# Patient Record
Sex: FEMALE | Race: WHITE | NOT HISPANIC OR LATINO | Employment: UNEMPLOYED | URBAN - METROPOLITAN AREA
[De-identification: names, ages, dates, MRNs, and addresses within clinical notes are randomized per-mention and may not be internally consistent; named-entity substitution may affect disease eponyms.]

---

## 2019-05-29 PROBLEM — E28.2 PCOS (POLYCYSTIC OVARIAN SYNDROME): Status: ACTIVE | Noted: 2019-05-29

## 2019-05-29 PROBLEM — R10.30 LOWER ABDOMINAL PAIN: Status: ACTIVE | Noted: 2019-05-29

## 2019-05-29 PROBLEM — N92.0 MENORRHAGIA WITH REGULAR CYCLE: Status: ACTIVE | Noted: 2019-05-29

## 2019-08-23 PROBLEM — F32.9 MDD (MAJOR DEPRESSIVE DISORDER): Status: ACTIVE | Noted: 2019-08-23

## 2019-08-23 PROBLEM — L73.2 HYDRADENITIS: Status: ACTIVE | Noted: 2019-08-23

## 2019-08-23 PROBLEM — E66.9 OBESITY: Status: ACTIVE | Noted: 2019-08-23

## 2019-10-10 PROBLEM — D25.9 FIBROID UTERUS: Status: ACTIVE | Noted: 2019-10-10

## 2019-11-14 PROBLEM — Z90.710 S/P LAPAROSCOPIC HYSTERECTOMY: Status: ACTIVE | Noted: 2019-11-14

## 2021-05-12 ENCOUNTER — PATIENT MESSAGE (OUTPATIENT)
Dept: RESEARCH | Facility: HOSPITAL | Age: 31
End: 2021-05-12

## 2022-10-20 PROBLEM — M54.50 LUMBAR PAIN: Status: ACTIVE | Noted: 2022-10-20

## 2022-10-20 PROBLEM — M25.50 POLYARTHRALGIA: Status: ACTIVE | Noted: 2022-10-20

## 2022-10-24 ENCOUNTER — SPECIALTY PHARMACY (OUTPATIENT)
Dept: PHARMACY | Facility: CLINIC | Age: 32
End: 2022-10-24

## 2022-10-24 DIAGNOSIS — L73.2 HIDRADENITIS SUPPURATIVA: Primary | ICD-10-CM

## 2022-10-24 NOTE — TELEPHONE ENCOUNTER
Incoming call from Darlene at [facility] calling in new rx for Dupixent. PA needed. Routing to assigned team to work on PA.

## 2022-10-28 ENCOUNTER — SPECIALTY PHARMACY (OUTPATIENT)
Dept: PHARMACY | Facility: CLINIC | Age: 32
End: 2022-10-28

## 2022-10-28 NOTE — TELEPHONE ENCOUNTER
Specialty Pharmacy - Initial Clinical Assessment    Specialty Medication Orders Linked to Encounter      Flowsheet Row Most Recent Value   Medication #1 adalimumab (HUMIRA,CF, PEN CROHNS-UC-HS) 80 mg/0.8 mL PnKt (Order#[id number], Rx#[id number])   Medication #2 adalimumab (HUMIRA) 80 mg/0.8 mL PnKt (Order#[id number], Rx#[id number])          Patient Diagnosis   L73.2 - Hidradenitis suppurativa    Subjective    Sophia Barroso is a 32 y.o. female, who is followed by the specialty pharmacy service for management and education.    Recent Encounters       Date Type Provider Description    10/28/2022 Specialty Pharmacy Jason Gutierres PharmD Initial Clinical Assessment    10/24/2022 Specialty Pharmacy Ayana Cook PharmD Referral Authorization          Clinical call attempts since last clinical assessment   10/28/2022  8:53 PM - Specialty Pharmacy - Clinical Assessment by Jason Gutierres PharmD     Current Outpatient Medications   Medication Sig    adalimumab (HUMIRA) 80 mg/0.8 mL PnKt Inject 0.8 mLs (80 mg total) into the skin every 14 (fourteen) days starting day 29.    adalimumab (HUMIRA,CF, PEN CROHNS-UC-HS) 80 mg/0.8 mL PnKt Inject 2 pens (160 mg) into the skin on day 1. Then inject 1 pen (80 mg) into the skin every 14 days thereafter, starting day 15    albuterol (PROVENTIL/VENTOLIN HFA) 90 mcg/actuation inhaler Inhale 2 puffs into the lungs every 4 (four) hours as needed.    atorvastatin (LIPITOR) 20 MG tablet Take 20 mg by mouth once daily.    gabapentin (NEURONTIN) 300 MG capsule Take 300 mg by mouth 2 (two) times daily.    hydrOXYzine pamoate (VISTARIL) 25 MG Cap Take 75 mg by mouth every evening.    hyoscyamine (LEVSIN/SL) 0.125 mg Subl Place under the tongue.    metFORMIN (GLUCOPHAGE) 500 MG tablet Take 500 mg by mouth once daily.    pantoprazole (PROTONIX) 40 MG tablet Take 1 tablet (40 mg total) by mouth once daily.    QUEtiapine (SEROQUEL) 200 MG Tab Take 200 mg by mouth.    venlafaxine (EFFEXOR) 100 MG Tab Take  100 mg by mouth 2 (two) times daily.   Last reviewed on 10/28/2022  4:44 PM by Jason Gutierres, PharmNAVJOT    Review of patient's allergies indicates:   Allergen Reactions    Latex, natural rubber Swelling    Doxycycline Itching    Prednisone Itching   Last reviewed on  10/28/2022 4:42 PM by Jason Gutierres    Drug Interactions    Drug interactions evaluated: yes  Clinically relevant drug interactions identified: no  Provided the patient with educational material regarding drug interactions: not applicable         Adverse Effects    Color Change: Pos  Pallor: Pos  Ulcers/sores: Pos  Wound: Pos       Assessment Questions - Documented Responses      Flowsheet Row Most Recent Value   Assessment    Medication Reconciliation completed for patient Yes   During the past 4 weeks, has patient missed any activities due to condition or medication? Missed School, Missed Planned Activities   During the past 4 weeks, did patient have any of the following urgent care visits? None   Goals of Therapy Status Discussed (new start)   Status of the patients ability to self-administer: Is Able   All education points have been covered with patient? Yes, supplemental printed education provided   Welcome packet contents reviewed and discussed with patient? Yes   Assesment completed? Yes   Plan Therapy being initiated   Do you need to open a clinical intervention (i-vent)? No   Do you want to schedule first shipment? Yes          Refill Questions - Documented Responses      Flowsheet Row Most Recent Value   Refill Screening Questions    When does the patient need to receive the medication? 11/01/22   Refill Delivery Questions    How will the patient receive the medication? Mail   When does the patient need to receive the medication? 11/01/22   Shipping Address Home   Address in [facility] confirmed and updated if neccessary? Yes   Expected Copay ($) 0   Is the patient able to afford the medication copay? Yes   Payment Method zero copay   Days  "supply of Refill 28   Supplies needed? Alcohol Swabs   Refill activity completed? Yes   Refill activity plan Refill scheduled   Shipment/Pickup Date: 10/31/22            Objective    She has a past medical history of Diabetes mellitus.    Tried/failed medications: abx, topicals, etc     BP Readings from Last 4 Encounters:   10/20/22 135/73   12/17/21 (!) 158/83   12/14/21 119/76   12/09/19 115/71     Ht Readings from Last 4 Encounters:   10/20/22 5' 9" (1.753 m)   12/14/21 5' 9" (1.753 m)   12/09/19 5' 9" (1.753 m)   11/14/19 5' 9" (1.753 m)     Wt Readings from Last 4 Encounters:   10/20/22 121.4 kg (267 lb 11.2 oz)   12/14/21 124.3 kg (274 lb)   12/09/19 115.2 kg (254 lb)   11/14/19 113.4 kg (250 lb)     Recent Labs   Lab Result Units 10/20/22  1357   Creatinine mg/dL 0.77   ALT U/L 111 H   AST U/L 48 H   Hepatitis B Surface Ag  Negative     The goals of prescribed drug therapy management include:  Supporting patient to meet the prescriber's medical treatment objectives  Improving or maintaining quality of life  Maintaining optimal therapy adherence  Minimizing and managing side effects      Goals of Therapy Status: Discussed (new start)    Assessment/Plan  Patient plans to start therapy on 11/01/22      Indication, dosage, appropriateness, effectiveness, safety and convenience of her specialty medication(s) were reviewed today.     Patient Education   Patient received education on the following:   Expectations and possible outcomes of therapy  Proper use, timely administration, and missed dose management  Duration of therapy  Side effects, including prevention, minimization, and management  Contraindications and safety precautions  New or changed medications, including prescribe and over the counter medications and supplements  Reviews recommended vaccinations, as appropriate  Storage, safe handling, and disposal    Tasks added this encounter   11/22/2022 - Refill Call (Auto Added)  7/28/2023 - Clinical - Follow " Up Assesement (Annual)   Tasks due within next 3 months   No tasks due.     Jason Gutierres, PharmD  [facility]  [address]  [facility] [address]  Phone: [phone]  Fax: [phone]

## 2022-10-28 NOTE — TELEPHONE ENCOUNTER
-PA approved from 10/28/2022 to 04/28/2022  Case ID: [id number]    Benefits Investigation   Insurance: Medicaid  Copay: $0

## 2022-11-01 ENCOUNTER — PATIENT MESSAGE (OUTPATIENT)
Dept: PHARMACY | Facility: CLINIC | Age: 32
End: 2022-11-01

## 2022-11-01 ENCOUNTER — SPECIALTY PHARMACY (OUTPATIENT)
Dept: PHARMACY | Facility: CLINIC | Age: 32
End: 2022-11-01

## 2022-11-01 NOTE — TELEPHONE ENCOUNTER
Contacted Eliza Reyna. Lot number and pertinent info provided.  will need to speak to patient directly.     Record # [id number]   # [phone] opt 1, opt 1 (direct line to  dept)

## 2022-11-07 NOTE — TELEPHONE ENCOUNTER
Incoming call from patient who informed me the  has agreed to replace her 2 failed Humira doses.     Patient also reported current flare. See I-vent for details.

## 2022-11-07 NOTE — TELEPHONE ENCOUNTER
Incoming call from patient who informed me she has not been contacted by [facility] . Phone number and reference number provided. Patient to call, confirm dose failure, and notify [facility] if replacement is not possible through .

## 2022-11-09 NOTE — TELEPHONE ENCOUNTER
Outgoing call to patient to see if she heard from  about her replacement pen. Patient states she should receive her shipment Friday. She has a friend that is an LPN who will be helping her with administering the injection.     Refill call adjusted.

## 2022-12-02 ENCOUNTER — SPECIALTY PHARMACY (OUTPATIENT)
Dept: PHARMACY | Facility: CLINIC | Age: 32
End: 2022-12-02

## 2022-12-02 NOTE — TELEPHONE ENCOUNTER
Outgoing call: patient is due to inject Humira on 12/9, she stated she went to urgent care and tested positive for strep and covid and prescribed antibiotics. Transferring to [facility].

## 2022-12-02 NOTE — TELEPHONE ENCOUNTER
Specialty Pharmacy - Refill Coordination    Specialty Medication Orders Linked to Encounter      Flowsheet Row Most Recent Value   Medication #1 adalimumab (HUMIRA,CF, PEN CROHNS-UC-HS) 80 mg/0.8 mL PnKt (Order#[id number], Rx#[id number])        See I-vent.    Refill Questions - Documented Responses      Flowsheet Row Most Recent Value   Patient Availability and HIPAA Verification    Does patient want to proceed with activity? Yes   HIPAA/medical authority confirmed? Yes   Relationship to patient of person spoken to? Self   Refill Screening Questions    Changes to allergies? No   Changes to medications? No   New conditions since last clinic visit? No   Unplanned office visit, urgent care, ED, or hospital admission in the last 4 weeks? Yes  [tested postive for strep and covid]   How does patient/caregiver feel medication is working? Good   Financial problems or insurance changes? No   How many doses of your specialty medications were missed in the last 4 weeks? 0   Would patient like to speak to a pharmacist? No   When does the patient need to receive the medication? 12/06/22   Refill Delivery Questions    How will the patient receive the medication? Mail   When does the patient need to receive the medication? 12/06/22   Shipping Address Home   Address in [facility] confirmed and updated if neccessary? Yes   Expected Copay ($) 0   Is the patient able to afford the medication copay? Yes   Payment Method zero copay   Days supply of Refill 28   Supplies needed? Alcohol Swabs   Refill activity completed? Yes   Refill activity plan Refill scheduled   Shipment/Pickup Date: 12/05/22            Current Outpatient Medications   Medication Sig    adalimumab (HUMIRA) 80 mg/0.8 mL PnKt Inject 0.8 mLs (80 mg total) into the skin every 14 (fourteen) days starting day 29.    adalimumab (HUMIRA,CF, PEN CROHNS-UC-HS) 80 mg/0.8 mL PnKt Inject 2 pens (160 mg) into the skin on day 1. Then inject 1 pen (80 mg) into the skin every  14 days thereafter, starting day 15    albuterol (PROVENTIL/VENTOLIN HFA) 90 mcg/actuation inhaler Inhale 2 puffs into the lungs every 4 (four) hours as needed.    atorvastatin (LIPITOR) 20 MG tablet Take 20 mg by mouth once daily.    gabapentin (NEURONTIN) 300 MG capsule Take 300 mg by mouth 2 (two) times daily.    hydrOXYzine pamoate (VISTARIL) 25 MG Cap Take 75 mg by mouth every evening.    hyoscyamine (LEVSIN/SL) 0.125 mg Subl Place under the tongue.    metFORMIN (GLUCOPHAGE) 500 MG tablet Take 500 mg by mouth once daily.    pantoprazole (PROTONIX) 40 MG tablet Take 1 tablet (40 mg total) by mouth once daily.    QUEtiapine (SEROQUEL) 200 MG Tab Take 200 mg by mouth.    venlafaxine (EFFEXOR) 100 MG Tab Take 100 mg by mouth 2 (two) times daily.   Last reviewed on 10/28/2022  4:44 PM by Jason Gutierres PharmD    Review of patient's allergies indicates:   Allergen Reactions    Latex, natural rubber Swelling    Doxycycline Itching    Prednisone Itching    Last reviewed on  10/28/2022 4:42 PM by Jason Gutierres      Tasks added this encounter   12/27/2022 - Refill Call (Auto Added)   Tasks due within next 3 months   No tasks due.     Nancy Shah, PharmD  [facility]  [address] [place], [place] [address]  [facility] [address]  Phone: [phone]  Fax: [phone]

## 2022-12-21 ENCOUNTER — SPECIALTY PHARMACY (OUTPATIENT)
Dept: PHARMACY | Facility: CLINIC | Age: 32
End: 2022-12-21

## 2022-12-21 NOTE — TELEPHONE ENCOUNTER
Incoming call from pt requesting Humira refill. Refill too soon until 12/26 per insurance. [facility] will reach out on 12/27 for refill set up. Pt verbalized understanding.

## 2022-12-27 ENCOUNTER — PATIENT MESSAGE (OUTPATIENT)
Dept: PHARMACY | Facility: CLINIC | Age: 32
End: 2022-12-27

## 2022-12-27 NOTE — TELEPHONE ENCOUNTER
Specialty Pharmacy - Refill Coordination    Specialty Medication Orders Linked to Encounter      Flowsheet Row Most Recent Value   Medication #1 adalimumab (HUMIRA) 80 mg/0.8 mL PnKt (Order#[id number], Rx#[id number])            Refill Questions - Documented Responses      Flowsheet Row Most Recent Value   Patient Availability and HIPAA Verification    Does patient want to proceed with activity? Yes   HIPAA/medical authority confirmed? Yes   Relationship to patient of person spoken to? Self   Refill Screening Questions    Changes to allergies? No   Changes to medications? No   New conditions since last clinic visit? No   Unplanned office visit, urgent care, ED, or hospital admission in the last 4 weeks? No   How does patient/caregiver feel medication is working? Excellent   Financial problems or insurance changes? No   How many doses of your specialty medications were missed in the last 4 weeks? 0   Would patient like to speak to a pharmacist? No   When does the patient need to receive the medication? 01/06/23   Refill Delivery Questions    How will the patient receive the medication? Mail   When does the patient need to receive the medication? 01/06/23   Shipping Address Home   Address in [facility] confirmed and updated if neccessary? Yes   Expected Copay ($) 0   Is the patient able to afford the medication copay? Yes   Payment Method zero copay   Days supply of Refill 28   Supplies needed? No supplies needed   Refill activity completed? Yes   Refill activity plan Refill scheduled   Shipment/Pickup Date: 12/28/22            Current Outpatient Medications   Medication Sig    adalimumab (HUMIRA) 80 mg/0.8 mL PnKt Inject 0.8 mLs (80 mg total) into the skin every 14 (fourteen) days starting day 29.    adalimumab (HUMIRA,CF, PEN CROHNS-UC-HS) 80 mg/0.8 mL PnKt Inject 2 pens (160 mg) into the skin on day 1. Then inject 1 pen (80 mg) into the skin every 14 days thereafter, starting day 15    albuterol  (PROVENTIL/VENTOLIN HFA) 90 mcg/actuation inhaler Inhale 2 puffs into the lungs every 4 (four) hours as needed.    atorvastatin (LIPITOR) 20 MG tablet Take 20 mg by mouth once daily.    gabapentin (NEURONTIN) 300 MG capsule Take 300 mg by mouth 2 (two) times daily.    hydrOXYzine pamoate (VISTARIL) 25 MG Cap Take 75 mg by mouth every evening.    hyoscyamine (LEVSIN/SL) 0.125 mg Subl Place under the tongue.    metFORMIN (GLUCOPHAGE) 500 MG tablet Take 500 mg by mouth once daily.    pantoprazole (PROTONIX) 40 MG tablet Take 1 tablet (40 mg total) by mouth once daily.    QUEtiapine (SEROQUEL) 200 MG Tab Take 200 mg by mouth.    venlafaxine (EFFEXOR) 100 MG Tab Take 100 mg by mouth 2 (two) times daily.   Last reviewed on 10/28/2022  4:44 PM by Jason Gutierres PharmD    Review of patient's allergies indicates:   Allergen Reactions    Latex, natural rubber Swelling    Doxycycline Itching    Prednisone Itching    Last reviewed on  10/28/2022 4:42 PM by Jason Gutierres      Tasks added this encounter   No tasks added.   Tasks due within next 3 months   12/27/2022 - Refill Call (Auto Added)     Janay PenaD  [facility]  [facility] [address]  [facility] [address]  Phone: [phone]  Fax: [phone]

## 2023-01-27 ENCOUNTER — SPECIALTY PHARMACY (OUTPATIENT)
Dept: PHARMACY | Facility: CLINIC | Age: 33
End: 2023-01-27

## 2023-01-27 NOTE — TELEPHONE ENCOUNTER
Specialty Pharmacy - Refill Coordination    Specialty Medication Orders Linked to Encounter      Flowsheet Row Most Recent Value   Medication #1 adalimumab (HUMIRA) 80 mg/0.8 mL PnKt (Order#[id number], Rx#[id number])            Refill Questions - Documented Responses      Flowsheet Row Most Recent Value   Patient Availability and HIPAA Verification    Does patient want to proceed with activity? Yes   HIPAA/medical authority confirmed? Yes   Relationship to patient of person spoken to? Self   Refill Screening Questions    Changes to allergies? No   Changes to medications? No   New conditions since last clinic visit? No   Unplanned office visit, urgent care, ED, or hospital admission in the last 4 weeks? No   How does patient/caregiver feel medication is working? Good   Financial problems or insurance changes? No   How many doses of your specialty medications were missed in the last 4 weeks? 0   Would patient like to speak to a pharmacist? No   When does the patient need to receive the medication? 02/03/23   Refill Delivery Questions    How will the patient receive the medication? Mail   When does the patient need to receive the medication? 02/03/23   Shipping Address Home   Address in [facility] confirmed and updated if neccessary? Yes   Expected Copay ($) 0   Is the patient able to afford the medication copay? Yes   Payment Method zero copay   Days supply of Refill 28   Supplies needed? No supplies needed   Refill activity completed? Yes   Refill activity plan Refill scheduled   Shipment/Pickup Date: 01/31/23            Current Outpatient Medications   Medication Sig    adalimumab (HUMIRA) 80 mg/0.8 mL PnKt Inject 0.8 mLs (80 mg total) into the skin every 14 (fourteen) days starting day 29.    adalimumab (HUMIRA,CF, PEN CROHNS-UC-HS) 80 mg/0.8 mL PnKt Inject 2 pens (160 mg) into the skin on day 1. Then inject 1 pen (80 mg) into the skin every 14 days thereafter, starting day 15    albuterol  (PROVENTIL/VENTOLIN HFA) 90 mcg/actuation inhaler Inhale 2 puffs into the lungs every 4 (four) hours as needed.    atorvastatin (LIPITOR) 20 MG tablet Take 20 mg by mouth once daily.    gabapentin (NEURONTIN) 300 MG capsule Take 300 mg by mouth 2 (two) times daily.    hydrOXYzine pamoate (VISTARIL) 25 MG Cap Take 75 mg by mouth every evening.    hyoscyamine (LEVSIN/SL) 0.125 mg Subl Place under the tongue.    metFORMIN (GLUCOPHAGE) 500 MG tablet Take 500 mg by mouth once daily.    pantoprazole (PROTONIX) 40 MG tablet Take 1 tablet (40 mg total) by mouth once daily.    QUEtiapine (SEROQUEL) 200 MG Tab Take 200 mg by mouth.    venlafaxine (EFFEXOR) 100 MG Tab Take 100 mg by mouth 2 (two) times daily.   Last reviewed on 1/9/2023  8:20 AM by Kayli Tello MA    Review of patient's allergies indicates:   Allergen Reactions    Latex, natural rubber Swelling    Doxycycline Itching    Prednisone Itching    Last reviewed on  1/9/2023 8:20 AM by Kayli Tello      Tasks added this encounter   2/24/2023 - Refill Call (Auto Added)   Tasks due within next 3 months   No tasks due.     [facility]  [address] [place] [address]  Phone: [phone]  Fax: [phone]

## 2023-02-24 ENCOUNTER — SPECIALTY PHARMACY (OUTPATIENT)
Dept: PHARMACY | Facility: CLINIC | Age: 33
End: 2023-02-24

## 2023-02-24 NOTE — TELEPHONE ENCOUNTER
Outgoing call regarding humira refill; per pt, she's due to inject today and has a pen on hand; next injection is on 3/10, and pt was informed that [facility] will follow up on 3/2 to schedule delivery

## 2023-03-01 NOTE — TELEPHONE ENCOUNTER
Specialty Pharmacy - Refill Coordination    Specialty Medication Orders Linked to Encounter      Flowsheet Row Most Recent Value   Medication #1 adalimumab (HUMIRA) 80 mg/0.8 mL PnKt (Order#[id number], Rx#[id number])            Refill Questions - Documented Responses      Flowsheet Row Most Recent Value   Patient Availability and HIPAA Verification    Does patient want to proceed with activity? Yes   HIPAA/medical authority confirmed? Yes   Relationship to patient of person spoken to? Self   Refill Screening Questions    Changes to allergies? No   Changes to medications? No   New conditions since last clinic visit? No   Unplanned office visit, urgent care, ED, or hospital admission in the last 4 weeks? No   How does patient/caregiver feel medication is working? Good   Financial problems or insurance changes? No   How many doses of your specialty medications were missed in the last 4 weeks? 0   Would patient like to speak to a pharmacist? No   When does the patient need to receive the medication? 03/09/23   Refill Delivery Questions    How will the patient receive the medication? Mail   When does the patient need to receive the medication? 03/09/23   Shipping Address Home   Address in [facility] confirmed and updated if neccessary? Yes   Expected Copay ($) 0   Is the patient able to afford the medication copay? Yes   Payment Method zero copay   Days supply of Refill 28   Refill activity completed? Yes   Refill activity plan Refill scheduled   Shipment/Pickup Date: 03/02/23            Current Outpatient Medications   Medication Sig    adalimumab (HUMIRA) 80 mg/0.8 mL PnKt Inject 0.8 mLs (80 mg total) into the skin every 14 (fourteen) days starting day 29.    adalimumab (HUMIRA,CF, PEN CROHNS-UC-HS) 80 mg/0.8 mL PnKt Inject 2 pens (160 mg) into the skin on day 1. Then inject 1 pen (80 mg) into the skin every 14 days thereafter, starting day 15    albuterol (PROVENTIL/VENTOLIN HFA) 90 mcg/actuation inhaler Inhale  2 puffs into the lungs every 4 (four) hours as needed.    atorvastatin (LIPITOR) 20 MG tablet Take 20 mg by mouth once daily.    gabapentin (NEURONTIN) 300 MG capsule Take 300 mg by mouth 2 (two) times daily.    hydrOXYzine pamoate (VISTARIL) 25 MG Cap Take 75 mg by mouth every evening.    hyoscyamine (LEVSIN/SL) 0.125 mg Subl Place under the tongue.    metFORMIN (GLUCOPHAGE) 500 MG tablet Take 500 mg by mouth once daily.    pantoprazole (PROTONIX) 40 MG tablet Take 1 tablet (40 mg total) by mouth once daily.    QUEtiapine (SEROQUEL) 200 MG Tab Take 200 mg by mouth.    venlafaxine (EFFEXOR) 100 MG Tab Take 100 mg by mouth 2 (two) times daily.   Last reviewed on 1/9/2023  8:20 AM by Kayli Tello MA    Review of patient's allergies indicates:   Allergen Reactions    Latex, natural rubber Swelling    Doxycycline Itching    Prednisone Itching    Last reviewed on  1/9/2023 8:20 AM by Kayli Tello      Tasks added this encounter   3/30/2023 - Refill Call (Auto Added)   Tasks due within next 3 months   No tasks due.     [facility]  [address] [place] [address]  Phone: [phone]  Fax: [phone]

## 2023-04-10 ENCOUNTER — PATIENT MESSAGE (OUTPATIENT)
Dept: PHARMACY | Facility: CLINIC | Age: 33
End: 2023-04-10

## 2023-04-13 ENCOUNTER — SPECIALTY PHARMACY (OUTPATIENT)
Dept: PHARMACY | Facility: CLINIC | Age: 33
End: 2023-04-13

## 2023-04-13 NOTE — TELEPHONE ENCOUNTER
Specialty Pharmacy - Refill Coordination    Specialty Medication Orders Linked to Encounter      Flowsheet Row Most Recent Value   Medication #1 adalimumab (HUMIRA) 80 mg/0.8 mL PnKt (Order#[id number], Rx#[id number])          Refill Questions - Documented Responses      Flowsheet Row Most Recent Value   Patient Availability and HIPAA Verification    Does patient want to proceed with activity? Unable to Reach     [facility] attempted to contact the patient on 3/30, 4/3, 4/10 and 4/13. Patient portal message sent on 4/13. [facility] last dispensed Humira on 3/2 for a 28 day supply.      We have had multiple attempts to the patient and have been unsuccessful to reach the patient. We will stop reaching out to the patient but in the event that the patient needs the med and contacts us, we will communicate and begin dispensing for the patient. At your next visit with the patient, please review the importance of being in contact with our specialty pharmacy as a part of our care team.      Amelia Brooks, Patient Care Assistant  [facility]  [address]  [facility] [address]  Phone: [phone]  Fax: [phone]

## 2025-06-19 ENCOUNTER — PATIENT MESSAGE (OUTPATIENT)
Dept: ADMINISTRATIVE | Facility: OTHER | Age: 35
End: 2025-06-19

## 2025-07-17 ENCOUNTER — PATIENT MESSAGE (OUTPATIENT)
Dept: ADMINISTRATIVE | Facility: OTHER | Age: 35
End: 2025-07-17

## 2025-07-24 ENCOUNTER — PATIENT MESSAGE (OUTPATIENT)
Dept: ADMINISTRATIVE | Facility: OTHER | Age: 35
End: 2025-07-24

## 2025-08-18 ENCOUNTER — PATIENT MESSAGE (OUTPATIENT)
Dept: ADMINISTRATIVE | Facility: OTHER | Age: 35
End: 2025-08-18